# Patient Record
Sex: FEMALE | Race: WHITE | URBAN - METROPOLITAN AREA
[De-identification: names, ages, dates, MRNs, and addresses within clinical notes are randomized per-mention and may not be internally consistent; named-entity substitution may affect disease eponyms.]

---

## 2020-08-19 ENCOUNTER — HISTORICAL (OUTPATIENT)
Dept: ADMINISTRATIVE | Facility: HOSPITAL | Age: 65
End: 2020-08-19

## 2020-08-19 LAB
ABS NEUT (OLG): 2.64 X10(3)/MCL (ref 2.1–9.2)
ALBUMIN SERPL-MCNC: 3.9 GM/DL (ref 3.4–5)
ALBUMIN/GLOB SERPL: 1.3 RATIO (ref 1.1–2)
ALP SERPL-CCNC: 94 UNIT/L (ref 40–150)
ALT SERPL-CCNC: 14 UNIT/L (ref 0–55)
APPEARANCE, UA: CLEAR
AST SERPL-CCNC: 18 UNIT/L (ref 5–34)
BACTERIA SPEC CULT: ABNORMAL /HPF
BASOPHILS # BLD AUTO: 0 X10(3)/MCL (ref 0–0.2)
BASOPHILS NFR BLD AUTO: 0 %
BILIRUB SERPL-MCNC: 0.6 MG/DL
BILIRUB UR QL STRIP: NEGATIVE
BILIRUBIN DIRECT+TOT PNL SERPL-MCNC: 0.3 MG/DL (ref 0–0.5)
BILIRUBIN DIRECT+TOT PNL SERPL-MCNC: 0.3 MG/DL (ref 0–0.8)
BUN SERPL-MCNC: 11 MG/DL (ref 9.8–20.1)
CALCIUM SERPL-MCNC: 8.6 MG/DL (ref 8.4–10.2)
CHLORIDE SERPL-SCNC: 109 MMOL/L (ref 98–107)
CHOLEST SERPL-MCNC: 294 MG/DL
CHOLEST/HDLC SERPL: 4 {RATIO} (ref 0–5)
CO2 SERPL-SCNC: 27 MMOL/L (ref 23–31)
COLOR UR: YELLOW
CREAT SERPL-MCNC: 0.77 MG/DL (ref 0.55–1.02)
ERYTHROCYTE [DISTWIDTH] IN BLOOD BY AUTOMATED COUNT: 13.7 % (ref 11.5–17)
GLOBULIN SER-MCNC: 2.9 GM/DL (ref 2.4–3.5)
GLUCOSE (UA): NEGATIVE
GLUCOSE SERPL-MCNC: 83 MG/DL (ref 82–115)
HCT VFR BLD AUTO: 40.9 % (ref 37–47)
HDLC SERPL-MCNC: 67 MG/DL (ref 35–60)
HGB BLD-MCNC: 13.1 GM/DL (ref 12–16)
HGB UR QL STRIP: NEGATIVE
KETONES UR QL STRIP: NEGATIVE
LDLC SERPL CALC-MCNC: 211 MG/DL (ref 50–140)
LEUKOCYTE ESTERASE UR QL STRIP: ABNORMAL
LYMPHOCYTES # BLD AUTO: 2.3 X10(3)/MCL (ref 0.6–4.6)
LYMPHOCYTES NFR BLD AUTO: 44 %
MCH RBC QN AUTO: 30 PG (ref 27–31)
MCHC RBC AUTO-ENTMCNC: 32 GM/DL (ref 33–36)
MCV RBC AUTO: 93.6 FL (ref 80–94)
MONOCYTES # BLD AUTO: 0.2 X10(3)/MCL (ref 0.1–1.3)
MONOCYTES NFR BLD AUTO: 5 %
NEUTROPHILS # BLD AUTO: 2.64 X10(3)/MCL (ref 2.1–9.2)
NEUTROPHILS NFR BLD AUTO: 50 %
NITRITE UR QL STRIP: NEGATIVE
PH UR STRIP: 6 [PH] (ref 5–9)
PLATELET # BLD AUTO: 282 X10(3)/MCL (ref 130–400)
PMV BLD AUTO: 9.6 FL (ref 9.4–12.4)
POTASSIUM SERPL-SCNC: 4 MMOL/L (ref 3.5–5.1)
PROT SERPL-MCNC: 6.8 GM/DL (ref 5.8–7.6)
PROT UR QL STRIP: NEGATIVE
RBC # BLD AUTO: 4.37 X10(6)/MCL (ref 4.2–5.4)
RBC #/AREA URNS HPF: ABNORMAL /[HPF]
SODIUM SERPL-SCNC: 143 MMOL/L (ref 136–145)
SP GR UR STRIP: 1.01 (ref 1–1.03)
SQUAMOUS EPITHELIAL, UA: ABNORMAL
TRIGL SERPL-MCNC: 78 MG/DL (ref 37–140)
UROBILINOGEN UR STRIP-ACNC: 0.2
VLDLC SERPL CALC-MCNC: 16 MG/DL
WBC # SPEC AUTO: 5.2 X10(3)/MCL (ref 4.5–11.5)
WBC #/AREA URNS HPF: ABNORMAL /[HPF]

## 2021-07-27 ENCOUNTER — HISTORICAL (OUTPATIENT)
Dept: ADMINISTRATIVE | Facility: HOSPITAL | Age: 66
End: 2021-07-27

## 2021-08-02 ENCOUNTER — HISTORICAL (OUTPATIENT)
Dept: ADMINISTRATIVE | Facility: HOSPITAL | Age: 66
End: 2021-08-02

## 2021-08-02 LAB
ALBUMIN SERPL-MCNC: 3.5 GM/DL (ref 3.4–4.8)
ALBUMIN/GLOB SERPL: 1.1 RATIO (ref 1.1–2)
ALP SERPL-CCNC: 81 UNIT/L (ref 40–150)
ALT SERPL-CCNC: 30 UNIT/L (ref 0–55)
AST SERPL-CCNC: 30 UNIT/L (ref 5–34)
BILIRUB SERPL-MCNC: 0.3 MG/DL
BILIRUBIN DIRECT+TOT PNL SERPL-MCNC: 0.1 MG/DL (ref 0–0.5)
BILIRUBIN DIRECT+TOT PNL SERPL-MCNC: 0.2 MG/DL (ref 0–0.8)
BUN SERPL-MCNC: 14.7 MG/DL (ref 9.8–20.1)
CALCIUM SERPL-MCNC: 9 MG/DL (ref 8.4–10.2)
CHLORIDE SERPL-SCNC: 105 MMOL/L (ref 98–107)
CHOLEST SERPL-MCNC: 262 MG/DL
CHOLEST/HDLC SERPL: 3 {RATIO} (ref 0–5)
CO2 SERPL-SCNC: 25 MMOL/L (ref 23–31)
CREAT SERPL-MCNC: 0.72 MG/DL (ref 0.55–1.02)
GLOBULIN SER-MCNC: 3.3 GM/DL (ref 2.4–3.5)
GLUCOSE SERPL-MCNC: 110 MG/DL (ref 82–115)
HDLC SERPL-MCNC: 85 MG/DL (ref 35–60)
LDLC SERPL CALC-MCNC: 158 MG/DL (ref 50–140)
POTASSIUM SERPL-SCNC: 4.3 MMOL/L (ref 3.5–5.1)
PROT SERPL-MCNC: 6.8 GM/DL (ref 5.8–7.6)
SODIUM SERPL-SCNC: 141 MMOL/L (ref 136–145)
TRIGL SERPL-MCNC: 97 MG/DL (ref 37–140)
VLDLC SERPL CALC-MCNC: 19 MG/DL

## 2021-08-10 ENCOUNTER — HISTORICAL (OUTPATIENT)
Dept: ADMINISTRATIVE | Facility: HOSPITAL | Age: 66
End: 2021-08-10

## 2021-09-07 ENCOUNTER — HISTORICAL (OUTPATIENT)
Dept: ADMINISTRATIVE | Facility: HOSPITAL | Age: 66
End: 2021-09-07

## 2022-04-07 ENCOUNTER — HISTORICAL (OUTPATIENT)
Dept: ADMINISTRATIVE | Facility: HOSPITAL | Age: 67
End: 2022-04-07

## 2022-04-24 VITALS
HEIGHT: 65 IN | SYSTOLIC BLOOD PRESSURE: 162 MMHG | BODY MASS INDEX: 23.99 KG/M2 | WEIGHT: 144 LBS | DIASTOLIC BLOOD PRESSURE: 92 MMHG

## 2024-11-03 ENCOUNTER — HOSPITAL ENCOUNTER (EMERGENCY)
Facility: HOSPITAL | Age: 69
Discharge: HOME | End: 2024-11-03
Attending: INTERNAL MEDICINE
Payer: MEDICARE

## 2024-11-03 VITALS
HEART RATE: 86 BPM | WEIGHT: 160 LBS | BODY MASS INDEX: 25.71 KG/M2 | RESPIRATION RATE: 18 BRPM | DIASTOLIC BLOOD PRESSURE: 99 MMHG | HEIGHT: 66 IN | TEMPERATURE: 98 F | SYSTOLIC BLOOD PRESSURE: 146 MMHG | OXYGEN SATURATION: 96 %

## 2024-11-03 DIAGNOSIS — M25.529 ELBOW PAIN: ICD-10-CM

## 2024-11-03 DIAGNOSIS — Z87.828 H/O REDUCTION OF CLOSED DISLOCATION: ICD-10-CM

## 2024-11-03 DIAGNOSIS — S53.125A CLOSED POSTERIOR DISLOCATION OF LEFT ELBOW, INITIAL ENCOUNTER: Primary | ICD-10-CM

## 2024-11-03 LAB
ALBUMIN SERPL-MCNC: 3.6 G/DL (ref 3.4–4.8)
ALBUMIN/GLOB SERPL: 1.1 RATIO (ref 1.1–2)
ALP SERPL-CCNC: 100 UNIT/L (ref 40–150)
ALT SERPL-CCNC: 30 UNIT/L (ref 0–55)
ANION GAP SERPL CALC-SCNC: 13 MEQ/L
AST SERPL-CCNC: 22 UNIT/L (ref 5–34)
BASOPHILS # BLD AUTO: 0.03 X10(3)/MCL
BASOPHILS NFR BLD AUTO: 0.3 %
BILIRUB SERPL-MCNC: 0.3 MG/DL
BUN SERPL-MCNC: 10.5 MG/DL (ref 9.8–20.1)
CALCIUM SERPL-MCNC: 9.2 MG/DL (ref 8.4–10.2)
CHLORIDE SERPL-SCNC: 111 MMOL/L (ref 98–107)
CO2 SERPL-SCNC: 19 MMOL/L (ref 23–31)
CREAT SERPL-MCNC: 0.87 MG/DL (ref 0.55–1.02)
CREAT/UREA NIT SERPL: 12
EOSINOPHIL # BLD AUTO: 0.01 X10(3)/MCL (ref 0–0.9)
EOSINOPHIL NFR BLD AUTO: 0.1 %
ERYTHROCYTE [DISTWIDTH] IN BLOOD BY AUTOMATED COUNT: 13.2 % (ref 11.5–17)
GFR SERPLBLD CREATININE-BSD FMLA CKD-EPI: >60 ML/MIN/1.73/M2
GLOBULIN SER-MCNC: 3.3 GM/DL (ref 2.4–3.5)
GLUCOSE SERPL-MCNC: 127 MG/DL (ref 82–115)
HCT VFR BLD AUTO: 40.5 % (ref 37–47)
HGB BLD-MCNC: 13.7 G/DL (ref 12–16)
IMM GRANULOCYTES # BLD AUTO: 0.04 X10(3)/MCL (ref 0–0.04)
IMM GRANULOCYTES NFR BLD AUTO: 0.4 %
LYMPHOCYTES # BLD AUTO: 0.94 X10(3)/MCL (ref 0.6–4.6)
LYMPHOCYTES NFR BLD AUTO: 9.1 %
MCH RBC QN AUTO: 31.3 PG (ref 27–31)
MCHC RBC AUTO-ENTMCNC: 33.8 G/DL (ref 33–36)
MCV RBC AUTO: 92.5 FL (ref 80–94)
MONOCYTES # BLD AUTO: 0.4 X10(3)/MCL (ref 0.1–1.3)
MONOCYTES NFR BLD AUTO: 3.9 %
NEUTROPHILS # BLD AUTO: 8.95 X10(3)/MCL (ref 2.1–9.2)
NEUTROPHILS NFR BLD AUTO: 86.2 %
NRBC BLD AUTO-RTO: 0 %
PLATELET # BLD AUTO: 300 X10(3)/MCL (ref 130–400)
PMV BLD AUTO: 9.2 FL (ref 7.4–10.4)
POTASSIUM SERPL-SCNC: 3.5 MMOL/L (ref 3.5–5.1)
PROT SERPL-MCNC: 6.9 GM/DL (ref 5.8–7.6)
RBC # BLD AUTO: 4.38 X10(6)/MCL (ref 4.2–5.4)
SODIUM SERPL-SCNC: 143 MMOL/L (ref 136–145)
WBC # BLD AUTO: 10.37 X10(3)/MCL (ref 4.5–11.5)

## 2024-11-03 PROCEDURE — 25000003 PHARM REV CODE 250: Performed by: INTERNAL MEDICINE

## 2024-11-03 PROCEDURE — 80053 COMPREHEN METABOLIC PANEL: CPT | Performed by: INTERNAL MEDICINE

## 2024-11-03 PROCEDURE — 99285 EMERGENCY DEPT VISIT HI MDM: CPT | Mod: 25

## 2024-11-03 PROCEDURE — 99152 MOD SED SAME PHYS/QHP 5/>YRS: CPT

## 2024-11-03 PROCEDURE — 96361 HYDRATE IV INFUSION ADD-ON: CPT

## 2024-11-03 PROCEDURE — 85025 COMPLETE CBC W/AUTO DIFF WBC: CPT | Performed by: INTERNAL MEDICINE

## 2024-11-03 PROCEDURE — 96374 THER/PROPH/DIAG INJ IV PUSH: CPT

## 2024-11-03 PROCEDURE — 63600175 PHARM REV CODE 636 W HCPCS: Performed by: INTERNAL MEDICINE

## 2024-11-03 PROCEDURE — 24600 TX CLSD ELBOW DISLC W/O ANES: CPT | Mod: LT

## 2024-11-03 PROCEDURE — 96375 TX/PRO/DX INJ NEW DRUG ADDON: CPT

## 2024-11-03 PROCEDURE — 99900035 HC TECH TIME PER 15 MIN (STAT)

## 2024-11-03 RX ORDER — ONDANSETRON HYDROCHLORIDE 2 MG/ML
4 INJECTION, SOLUTION INTRAVENOUS ONCE
Status: COMPLETED | OUTPATIENT
Start: 2024-11-03 | End: 2024-11-03

## 2024-11-03 RX ORDER — HYDROCODONE BITARTRATE AND ACETAMINOPHEN 7.5; 325 MG/1; MG/1
1 TABLET ORAL EVERY 6 HOURS PRN
Qty: 12 TABLET | Refills: 0 | Status: SHIPPED | OUTPATIENT
Start: 2024-11-03

## 2024-11-03 RX ORDER — ETOMIDATE 2 MG/ML
INJECTION INTRAVENOUS
Status: DISCONTINUED
Start: 2024-11-03 | End: 2024-11-03 | Stop reason: HOSPADM

## 2024-11-03 RX ORDER — ETOMIDATE 2 MG/ML
20 INJECTION INTRAVENOUS
Status: DISCONTINUED | OUTPATIENT
Start: 2024-11-03 | End: 2024-11-03 | Stop reason: HOSPADM

## 2024-11-03 RX ORDER — ONDANSETRON 4 MG/1
4 TABLET, ORALLY DISINTEGRATING ORAL EVERY 6 HOURS PRN
Qty: 15 TABLET | Refills: 0 | Status: SHIPPED | OUTPATIENT
Start: 2024-11-03

## 2024-11-03 RX ADMIN — ETOMIDATE 20 MG: 2 INJECTION INTRAVENOUS at 01:11

## 2024-11-03 RX ADMIN — SODIUM CHLORIDE 250 ML: 9 INJECTION, SOLUTION INTRAVENOUS at 01:11

## 2024-11-03 RX ADMIN — ONDANSETRON 4 MG: 2 INJECTION INTRAMUSCULAR; INTRAVENOUS at 12:11

## 2024-11-03 RX ADMIN — SODIUM CHLORIDE 250 ML: 9 INJECTION, SOLUTION INTRAVENOUS at 12:11

## 2024-11-03 NOTE — ED PROVIDER NOTES
Source of History:  Patient, no limitations    Chief complaint:  Fall (C/o left elbow pain and swelling s/p fall from bed (1 M) . Swelling noted to left elbow)      HPI:  Jennifer Corley is a 69 y.o. female presenting with Fall (C/o left elbow pain and swelling s/p fall from bed (1 M) . Swelling noted to left elbow)         The patient complains of pain in the left elbow  after a fall. Onset of symptoms was this morning. Patient describes pain as sharp/stabbing and throbbing. Pain severity now is intense. Pain is aggravated by movement. Pain is alleviated by immobilization. Symptoms associated with pain include loss of motion. The patient denies other injuries.  Care prior to arrival consisted of nothing          Review of Systems   Constitutional symptoms:  Negative except as documented in HPI.   Skin symptoms:  Negative except as documented in HPI.   HEENT symptoms:  Negative except as documented in HPI.   Respiratory symptoms:  Negative except as documented in HPI.   Cardiovascular symptoms:  Negative except as documented in HPI.   Gastrointestinal symptoms:  Negative except as documented in HPI.    Genitourinary symptoms:  Negative except as documented in HPI.   Musculoskeletal symptoms:  Negative except as documented in HPI.   Neurologic symptoms:  Negative except as documented in HPI.   Psychiatric symptoms:  Negative except as documented in HPI.   Allergy/immunologic symptoms:  Negative except as documented in HPI.             Additional review of systems information: All other systems reviewed and otherwise negative.      Review of patient's allergies indicates:  No Known Allergies    PMH:  As per HPI and below:    History reviewed. No pertinent past medical history.    History reviewed. No pertinent family history.    History reviewed. No pertinent surgical history.    Social History     Tobacco Use    Smoking status: Unknown       There is no problem list on file for this patient.       Physical Exam:   "  BP (!) 167/110 (BP Location: Right arm, Patient Position: Lying)   Pulse 99   Temp 98.2 °F (36.8 °C) (Oral)   Resp 18   Ht 5' 6" (1.676 m)   Wt 72.6 kg (160 lb)   SpO2 (!) 94%   Breastfeeding No   BMI 25.82 kg/m²     Nursing note and vital signs reviewed.    General:  Alert, appears uncomfortable and in pain  Skin: Normal for Ethnic Origin, No cyanosis  HEENT: Normocephalic and atraumatic, Vision unchanged, Pupils symmetric, No icterus , Nasal mucosa is pink and moist  Cardiovascular:  Regular rate and rhythm, No edema  Chest Wall: No deformity, equal chest rise  Respiratory:  Lungs are clear to auscultation, respirations are non-labored.    Musculoskeletal:  left elbow deformity, N/V intact distal to injury, Normal perfusion to all extremities  Gastrointestinal:  Soft, Non distended  Neurological:  Alert and oriented, normal motor observed, normal speech observed.    Psychiatric:  Cooperative, appropriate mood & affect.        Labs that have been ordered have been independently reviewed and interpreted by myself.     Old Chart Reviewed.      Initial Impression/ Differential Dx:  Bursitis, muscle strain, tendonitis, arthritis, cervical radiculopathy, dislocation, fracture, neuropathy      MDM:      Reviewed Nurses Note.    Reviewed Pertinent old records.    Orders Placed This Encounter    PROCEDURAL SEDATION ED    ORTHOPEDIC INJURY TREATMENT    Pulse Oximeter    SLING SWATHE UNIV FOAM 5X54IN    X-Ray Elbow Complete Left    X-Ray Elbow 2 Views Left    CBC Auto Differential    Comprehensive Metabolic Panel    CBC with Differential    Ambulatory referral/consult to Orthopedics    Prepare for procedural sedation    Cardiac Monitoring - Adult    Apply Sling    Insert peripheral IV    ondansetron injection 4 mg    sodium chloride 0.9% bolus 250 mL 250 mL    etomidate injection 20 mg    sodium chloride 0.9% bolus 250 mL 250 mL    etomidate (AMIDATE) 2 mg/mL injection    HYDROcodone-acetaminophen (NORCO) 7.5-325 " mg per tablet    ondansetron (ZOFRAN-ODT) 4 MG TbDL                    Labs Reviewed   COMPREHENSIVE METABOLIC PANEL - Abnormal       Result Value    Sodium 143      Potassium 3.5      Chloride 111 (*)     CO2 19 (*)     Glucose 127 (*)     Blood Urea Nitrogen 10.5      Creatinine 0.87      Calcium 9.2      Protein Total 6.9      Albumin 3.6      Globulin 3.3      Albumin/Globulin Ratio 1.1      Bilirubin Total 0.3            ALT 30      AST 22      eGFR >60      Anion Gap 13.0      BUN/Creatinine Ratio 12     CBC WITH DIFFERENTIAL - Abnormal    WBC 10.37      RBC 4.38      Hgb 13.7      Hct 40.5      MCV 92.5      MCH 31.3 (*)     MCHC 33.8      RDW 13.2      Platelet 300      MPV 9.2      Neut % 86.2      Lymph % 9.1      Mono % 3.9      Eos % 0.1      Basophil % 0.3      Lymph # 0.94      Neut # 8.95      Mono # 0.40      Eos # 0.01      Baso # 0.03      IG# 0.04      IG% 0.4      NRBC% 0.0     CBC W/ AUTO DIFFERENTIAL    Narrative:     The following orders were created for panel order CBC Auto Differential.  Procedure                               Abnormality         Status                     ---------                               -----------         ------                     CBC with Differential[[id number]]        Abnormal            Final result                 Please view results for these tests on the individual orders.          X-Ray Elbow 2 Views Left   Final Result      X-Ray Elbow Complete Left   Final Result      As above.         Electronically signed by: Franco Vaughn   Date:    11/03/2024   Time:    12:08           Admission on 11/03/2024   Component Date Value Ref Range Status    Sodium 11/03/2024 143  136 - 145 mmol/L Final    Potassium 11/03/2024 3.5  3.5 - 5.1 mmol/L Final    Chloride 11/03/2024 111 (H)  98 - 107 mmol/L Final    CO2 11/03/2024 19 (L)  23 - 31 mmol/L Final    Glucose 11/03/2024 127 (H)  82 - 115 mg/dL Final    Blood Urea Nitrogen 11/03/2024 10.5  9.8 - 20.1 mg/dL Final     Creatinine 11/03/2024 0.87  0.55 - 1.02 mg/dL Final    Calcium 11/03/2024 9.2  8.4 - 10.2 mg/dL Final    Protein Total 11/03/2024 6.9  5.8 - 7.6 gm/dL Final    Albumin 11/03/2024 3.6  3.4 - 4.8 g/dL Final    Globulin 11/03/2024 3.3  2.4 - 3.5 gm/dL Final    Albumin/Globulin Ratio 11/03/2024 1.1  1.1 - 2.0 ratio Final    Bilirubin Total 11/03/2024 0.3  <=1.5 mg/dL Final    ALP 11/03/2024 100  40 - 150 unit/L Final    ALT 11/03/2024 30  0 - 55 unit/L Final    AST 11/03/2024 22  5 - 34 unit/L Final    eGFR 11/03/2024 >60  mL/min/1.73/m2 Final    Anion Gap 11/03/2024 13.0  mEq/L Final    BUN/Creatinine Ratio 11/03/2024 12   Final    WBC 11/03/2024 10.37  4.50 - 11.50 x10(3)/mcL Final    RBC 11/03/2024 4.38  4.20 - 5.40 x10(6)/mcL Final    Hgb 11/03/2024 13.7  12.0 - 16.0 g/dL Final    Hct 11/03/2024 40.5  37.0 - 47.0 % Final    MCV 11/03/2024 92.5  80.0 - 94.0 fL Final    MCH 11/03/2024 31.3 (H)  27.0 - 31.0 pg Final    MCHC 11/03/2024 33.8  33.0 - 36.0 g/dL Final    RDW 11/03/2024 13.2  11.5 - 17.0 % Final    Platelet 11/03/2024 300  130 - 400 x10(3)/mcL Final    MPV 11/03/2024 9.2  7.4 - 10.4 fL Final    Neut % 11/03/2024 86.2  % Final    Lymph % 11/03/2024 9.1  % Final    Mono % 11/03/2024 3.9  % Final    Eos % 11/03/2024 0.1  % Final    Basophil % 11/03/2024 0.3  % Final    Lymph # 11/03/2024 0.94  0.6 - 4.6 x10(3)/mcL Final    Neut # 11/03/2024 8.95  2.1 - 9.2 x10(3)/mcL Final    Mono # 11/03/2024 0.40  0.1 - 1.3 x10(3)/mcL Final    Eos # 11/03/2024 0.01  0 - 0.9 x10(3)/mcL Final    Baso # 11/03/2024 0.03  <=0.2 x10(3)/mcL Final    IG# 11/03/2024 0.04  0 - 0.04 x10(3)/mcL Final    IG% 11/03/2024 0.4  % Final    NRBC% 11/03/2024 0.0  % Final       Imaging Results              X-Ray Elbow 2 Views Left (Final result)  Result time 11/03/24 13:56:09   Procedure changed from X-Ray Elbow Complete Left     Final result by Hair Lo MD (11/03/24 13:56:09)                   Narrative:    EXAMINATION  XR ELBOW 2  VIEWS LEFT    CLINICAL HISTORY  post reduction; Personal history of other (healed) physical injury and trauma    TECHNIQUE  A total of 1 image submitted of the left elbow.    COMPARISON  3 November 2024, 11:47    FINDINGS  Previous posterior dislocation reduced in the interval, unremarkable joint alignment.  No definite displaced fracture is identified by limited single view technique.  There is irregular lucency along the distal anterior and posterior humerus suggestive of joint effusion with displaced fat pads.    No new soft tissue abnormality.    IMPRESSION  1. Posterior left elbow dislocation reduced in the interval.  Alignment unremarkable within limitations of single-view technique.  2. Presence of joint effusion with no definite displaced fracture.  Close clinical and imaging follow-up recommended if there is elevated concern for occult nondisplaced fracture.      Electronically signed by: Hair Lo  Date:    11/03/2024  Time:    13:56                                     X-Ray Elbow Complete Left (Final result)  Result time 11/03/24 12:08:24      Final result by Franco Vaughn MD (11/03/24 12:08:24)                   Impression:      As above.      Electronically signed by: Franco Vaughn  Date:    11/03/2024  Time:    12:08               Narrative:    EXAMINATION:  XR ELBOW COMPLETE 3 VIEW LEFT    CLINICAL HISTORY:  Pain in unspecified elbow    TECHNIQUE:  Three views of the left elbow.    COMPARISON:  No prior imaging available for comparison    FINDINGS:  Posterior dislocation is noted.  No definite fracture is identified.  There is a small os ossific density adjacent to the olecranon which may represent avulsed fracture fragment.  Recommend continued follow-up.                                                     ED Course as of 11/03/24 1407   Sun Nov 03, 2024   1154 Last oral intake was a cinnamon roll around 10:00 a.m., will delay procedural sedation till around 1 p.m. or after [MP]   1326  CO2(!): 19 [MP]      ED Course User Index  [MP] Chris Lang DO                Procedural Sedation        Date/Time: 11/3/2024 1:43 PM    Performed by: Chris Lang DO  Authorized by: Chris Lang DO  ASA Class: Class 2 - Mild Illness without functional impairment.  Mallampati Score: Class 2 - Visualization of the soft palate, fauces, and uvula.   NPO STATUS:  Date/Time of last solid: 11/3/2024 10:00 AM  Contents of last solid: cinnamon roll    Equipment: on cardiac monitor., on supplemental oxygen., on BP monitor., suction available., on CO2 monitor. and airway equipment available.     Sedation type: moderate (conscious) sedation    Sedatives: etomidate  Sedation start date/time: 11/3/2024 1:36 PM  Sedation end date/time: 11/3/2024 1:42 PM  Vitals: Vital signs were monitored during sedation.  Complications: No complications.   Patient/Family history of anesthesia or sedation complications: No    Orthopedic Injury    Date/Time: 11/3/2024 1:46 PM    Performed by: Chris Lang DO  Authorized by: Chris Lang DO    Location procedure was performed:  [facility] EMERGENCY DEPARTMENT  Consent Done?:  Yes  Universal Protocol:     Verbal consent obtained?: Yes      Written consent obtained?: Yes      Risks and benefits: Risks, benefits and alternatives were discussed      Consent given by:  Patient    Patient states understanding of procedure being performed: Yes      Patient's understanding of procedure matches consent: Yes      Procedure consent matches procedure scheduled: Yes      Relevant documents present and verified: Yes      Test results available and properly labeled: Yes      Site marked: Yes      Patient identity confirmed:  Verbally with patient and name    Time Out: Immediately prior to the procedure a time out was called    Injury:     Injury location:  Elbow    Location details:  Left elbow    Injury type:  Dislocation    Dislocation type: posterior        Pre-procedure  assessment:     Neurovascular status: Neurovascularly intact      Distal perfusion: normal      Neurological function: normal      Range of motion: reduced      Local anesthesia used?: No      Patient sedated?: Yes      ASA Class:  Class 2 - Mild Illness without functional impairment.    Mallampati Score:  Class 2 - Visualization of the soft palate, fauces, and uvula.  Date/Time of last solid:  11/3/2024 10:00 AM    Contents of Last Food Intake:  Cinnamon roll    Patient/Family history of anesthesia or sedation complications: No      Sedation type: moderate (conscious) sedation      Sedation:  Etomidate    Sedation start:  11/3/2024 1:36 PM    Sedation end:  11/3/2024 1:42 PM      Procedure details:     Description of findings:  Posterior elbow dislocation, left side  Selections made in this section will also lock the Injury type section above.:     Manipulation performed?: Yes      Reduction method:  Flexion and direct traction    Reduction method:  Flexion and direct traction    Reduction method:  Flexion and direct traction    Reduction method:  Flexion and direct traction    Reduction method:  Flexion and direct traction    Reduction method:  Flexion and direct traction    Reduction successful?: Yes      Confirmation: Reduction confirmed by x-ray      Immobilization:  Sling    Technical Procedures Used:  Reduction of posterior dislocation of elbow    Significant surgical tasks conducted by the assistant(s):  Closed reduction of posterior left elbow dislocation    Complications: No      Estimated blood loss (mL):  0    Specimens: No      Implants: No    Post-procedure assessment:     Neurovascular status: Neurovascularly intact      Distal perfusion: normal      Neurological function: normal      Range of motion: normal      Patient tolerance:  Patient tolerated the procedure well with no immediate complications            Diagnostic Impression:    1. Closed posterior dislocation of left elbow, initial encounter     2. Elbow pain    3. H/O reduction of closed dislocation         ED Disposition Condition    Discharge Stable             Follow-up Information       [facility] - Emergency Dept.    Specialty: Emergency Medicine  Why: If symptoms worsen  Contact information:  [address]  [facility] [id number] [phone]             Call  Pola Riley DO.    Specialty: Orthopedic Surgery  Contact information:  [facility]  [address] [place] [address]  [phone]                              ED Prescriptions       Medication Sig Dispense Start Date End Date Auth. Provider    HYDROcodone-acetaminophen (NORCO) 7.5-325 mg per tablet Take 1 tablet by mouth every 6 (six) hours as needed for Pain. 12 tablet 11/3/2024 -- Chris Lang DO    ondansetron (ZOFRAN-ODT) 4 MG TbDL Take 1 tablet (4 mg total) by mouth every 6 (six) hours as needed (Nausea). 15 tablet 11/3/2024 -- Chris Lang DO          Follow-up Information       Follow up With Specialties Details Why Contact Info    [facility] - Emergency Dept Emergency Medicine  If symptoms worsen [address]  [facility] [id number] [phone]    Pola Riley DO Orthopedic Surgery Call   [address] [facility]  [address] [place] [id number]  [phone]               Chris Lang DO  11/03/24 1401

## 2024-11-07 DIAGNOSIS — S53.125A CLOSED POSTERIOR DISLOCATION OF LEFT ELBOW, INITIAL ENCOUNTER: Primary | ICD-10-CM

## 2024-11-13 ENCOUNTER — OFFICE VISIT (OUTPATIENT)
Dept: ORTHOPEDICS | Facility: CLINIC | Age: 69
End: 2024-11-13
Payer: MEDICARE

## 2024-11-13 ENCOUNTER — HOSPITAL ENCOUNTER (OUTPATIENT)
Dept: RADIOLOGY | Facility: CLINIC | Age: 69
Discharge: HOME | End: 2024-11-13
Attending: ORTHOPAEDIC SURGERY
Payer: MEDICARE

## 2024-11-13 VITALS
DIASTOLIC BLOOD PRESSURE: 89 MMHG | WEIGHT: 158.75 LBS | TEMPERATURE: 83 F | HEIGHT: 66 IN | BODY MASS INDEX: 25.51 KG/M2 | SYSTOLIC BLOOD PRESSURE: 130 MMHG

## 2024-11-13 DIAGNOSIS — S53.115A ANTERIOR DISLOCATION OF LEFT ELBOW, INITIAL ENCOUNTER: Primary | ICD-10-CM

## 2024-11-13 DIAGNOSIS — S53.115A ANTERIOR DISLOCATION OF LEFT ELBOW, INITIAL ENCOUNTER: ICD-10-CM

## 2024-11-13 DIAGNOSIS — S53.125A CLOSED POSTERIOR DISLOCATION OF LEFT ELBOW, INITIAL ENCOUNTER: ICD-10-CM

## 2024-11-13 PROCEDURE — 3008F BODY MASS INDEX DOCD: CPT | Mod: CPTII,,, | Performed by: ORTHOPAEDIC SURGERY

## 2024-11-13 PROCEDURE — 1159F MED LIST DOCD IN RCRD: CPT | Mod: CPTII,,, | Performed by: ORTHOPAEDIC SURGERY

## 2024-11-13 PROCEDURE — 99204 OFFICE O/P NEW MOD 45 MIN: CPT | Mod: ,,, | Performed by: ORTHOPAEDIC SURGERY

## 2024-11-13 PROCEDURE — 3075F SYST BP GE 130 - 139MM HG: CPT | Mod: CPTII,,, | Performed by: ORTHOPAEDIC SURGERY

## 2024-11-13 PROCEDURE — 3079F DIAST BP 80-89 MM HG: CPT | Mod: CPTII,,, | Performed by: ORTHOPAEDIC SURGERY

## 2024-11-13 PROCEDURE — 73080 X-RAY EXAM OF ELBOW: CPT | Mod: LT,,, | Performed by: ORTHOPAEDIC SURGERY

## 2024-11-13 RX ORDER — HYDROXYZINE HYDROCHLORIDE 25 MG/1
TABLET, FILM COATED ORAL
COMMUNITY
Start: 2024-07-29

## 2024-11-13 RX ORDER — SERTRALINE HYDROCHLORIDE 100 MG/1
100 TABLET, FILM COATED ORAL EVERY MORNING
COMMUNITY
Start: 2024-08-19

## 2024-11-13 RX ORDER — OLANZAPINE 15 MG/1
15 TABLET ORAL NIGHTLY
COMMUNITY
Start: 2024-08-21

## 2024-11-13 RX ORDER — HYDROCODONE BITARTRATE AND ACETAMINOPHEN 7.5; 325 MG/1; MG/1
1 TABLET ORAL EVERY 4 HOURS PRN
Qty: 34 TABLET | Refills: 0 | Status: SHIPPED | OUTPATIENT
Start: 2024-11-13

## 2024-11-13 NOTE — PROGRESS NOTES
Subjective:       Patient ID: Jennifer Corley is a 69 y.o. female.  Chief Complaint   Patient presents with    Left Forearm - Injury     10 DAYS OUT FROM LEFT ELBOW DISLOCATION. NOT PROPERLY WEARING SLING AND SWATHE. DENIES INCREASE PAIN AND DISCOMFORT.        HPI:  History of Present Illness    HPI:  Ms. Corley presents for follow-up of an elbow dislocation that occurred approximately 10-11 days ago. She reports ongoing pain in the elbow and states she has run out of prescribed pain medication. She has been managing the pain with OTC medication, taking ibuprofen 800 mg every six hours for almost a week since running out of prescribed pain medication, and also mentions taking low-dose aspirin. She reports difficulty elevating the arm and significant bruising. The pain is localized to the elbow, with no reported shoulder pain.    She has been using a sling and applying ice packs to manage the injury. When asked about movement, she describes it as smooth and states it feels acceptable. She has not been actively moving the arm, explaining she has been using the sling and applying ice packs. She denies having had surgery for this injury, though she was sedated for the initial reduction. She expresses concern about the range of motion, noting pain when attempting to extend the arm.    She denies any shoulder pain. She denies any history of surgery for this injury.    IMAGING:  X-rays of the patient's left elbow (3 views) taken on November 13, 2024, showed a stable elbow joint without signs of acute fracture. A possible effusion was noted, but there was no sign of residual dislocation. On November 3, 2024, a single-view X-ray of the left elbow confirmed successful reduction of the previous dislocation. Earlier that day, 3-view X-rays of the left elbow revealed a complete dislocation of the radial head, with the ulna appearing to be posterior.    MEDICATIONS:  Ms. Corley was prescribed Hydrocodone after the initial dislocation,  "but she ran out about a week ago. She is taking Ibuprofen 800 mg, 4 tablets every 6 hours for pain management, which she has been on for about a week. Ms. Corley is also on a low dose of Aspirin.      ROS:  Musculoskeletal: +joint pain, denies muscle pain          ROS:  Constitutional: Denies fever chills  Eyes: No change in vision  ENT: No ringing or current infections  CV: No chest pain  Resp: No labored breathing  MSK: Pain evident at site of injury located in HPI,   Integ: No signs of abrasions or lacerations  Neuro: No numbness or tingling  Lymphatic: No swelling outside the area of injury     Current Outpatient Medications on File Prior to Visit   Medication Sig Dispense Refill    HYDROcodone-acetaminophen (NORCO) 7.5-325 mg per tablet Take 1 tablet by mouth every 6 (six) hours as needed for Pain. 12 tablet 0    hydrOXYzine HCL (ATARAX) 25 MG tablet       OLANZapine (ZYPREXA) 15 MG Tab Take 15 mg by mouth every evening.      ondansetron (ZOFRAN-ODT) 4 MG TbDL Take 1 tablet (4 mg total) by mouth every 6 (six) hours as needed (Nausea). 15 tablet 0    sertraline (ZOLOFT) 100 MG tablet Take 100 mg by mouth every morning.       No current facility-administered medications on file prior to visit.          Objective:      /89   Temp (!) 83 °F (28.3 °C)   Ht 5' 6" (1.676 m)   Wt 72 kg (158 lb 11.7 oz)   BMI 25.62 kg/m²   General the patient is alert and oriented x3 no acute distress nontoxic-appearing appropriate affect.    Constitutional: Vital signs are examined and stable.  Resp: No signs of labored breathing    LUE: --Skin:  Significant amount of ecchymosis.  Elbow appears to be stable with a hinged exercises.  Appears smooth gliding motion supination pronation flexion-extension intact.  Patient has near full range of motion           -MSK: STR 5/5 AIN/PIN/Median/Radial/Ulnar motor           -Neuro:  Sensation intact to light touch C5-T1 dermatomes           -Lymphatic: No signs of lymphadenopathy, No " "signs of swelling,           -CV:Capillary refill is less than 2 seconds. Radial and ulnar pulses 2/4. Compartments Soft and compressible              Body mass index is 25.62 kg/m².  Ideal body weight: 59.3 kg (130 lb 11.7 oz)  Adjusted ideal body weight: 64.4 kg (141 lb 14.9 oz)  No results found for: "HGBA1C"  Hgb   Date Value Ref Range Status   11/03/2024 13.7 12.0 - 16.0 g/dL Final   08/19/2020 13.1 12.0 - 16.0 gm/dL Final     No results found for: "[id number]"  WBC   Date Value Ref Range Status   11/03/2024 10.37 4.50 - 11.50 x10(3)/mcL Final   08/19/2020 5.2 4.5 - 11.5 x10(3)/mcL Final       Radiology:  Three views left elbow skeletally mature individual shows elbow joint appears to be stable no sign of fracture.  Appears to be well reduced.        Assessment:         1. Anterior dislocation of left elbow, initial encounter  X-Ray Elbow Complete Left              Plan:         No follow-ups on file.    Jennifer was seen today for injury.    Diagnoses and all orders for this visit:    Anterior dislocation of left elbow, initial encounter  -     X-Ray Elbow Complete Left; Future      Assessment & Plan    PLAN SUMMARY:  - Prescribed Norco (hydrocodone) for 7 days with potential for lower dose refill  - Recommend ibuprofen 800mg every 8 hours as needed for pain  - Referred to physical therapy for elbow rehabilitation  - Discontinue use of sling  - Perform elbow exercises every 3 hours  - Follow up in about 4 weeks to ensure continued range of motion    FOLLOW UP:  - Follow up in about 4 weeks, after Thanksgiving but before Sky, to ensure continued range of motion.    RETURN TO ACTIVITY:  - Discontinue use of sling. Avoid extending arm straight out to prevent dislocation. Perform elbow exercises (bending and straightening) every 3 hours.    MEDICATIONS PRESCRIBED:  - Prescribed Norco (hydrocodone) for 7 days with potential for refill at lower dose. Recommend ibuprofen 800mg every 8 hours as needed for " pain.    REFERRALS:  - Referred to physical therapy for elbow rehabilitation and to maintain range of motion. Ms. Corley to schedule appointment across the [place].    LIFESTYLE:  - Perform elbow exercises every 3 hours, moving arm from shoulder to as straight as possible.          Patient understands physical therapy will help maintain range of motion.  We discussed dislocation events we discussed complications of the stiffness and instability.      This note/OR report was created with the assistance of  voice recognition software or phone  dictation.  There may be transcription errors as a result of using this technology however minimal. Effort has been made to assure accuracy of transcription but any obvious errors or omissions should be clarified with the author of the document.     This note was generated with the assistance of ambient listening technology. Verbal consent was obtained by the patient and accompanying visitor(s) for the recording of patient appointment to facilitate this note. I attest to having reviewed and edited the generated note for accuracy, though some syntax or spelling errors may persist. Please contact the author of this note for any clarification.       Pola Riley DO  Orthopedic Trauma Surgery  11/13/2024      No future appointments.

## 2024-12-09 ENCOUNTER — OFFICE VISIT (OUTPATIENT)
Dept: ORTHOPEDICS | Facility: CLINIC | Age: 69
End: 2024-12-09
Payer: MEDICARE

## 2024-12-09 ENCOUNTER — HOSPITAL ENCOUNTER (OUTPATIENT)
Dept: RADIOLOGY | Facility: CLINIC | Age: 69
Discharge: HOME | End: 2024-12-09
Attending: PHYSICIAN ASSISTANT
Payer: MEDICARE

## 2024-12-09 VITALS
HEIGHT: 66 IN | SYSTOLIC BLOOD PRESSURE: 146 MMHG | BODY MASS INDEX: 25.51 KG/M2 | DIASTOLIC BLOOD PRESSURE: 99 MMHG | WEIGHT: 158.75 LBS | HEART RATE: 82 BPM

## 2024-12-09 DIAGNOSIS — S53.125A CLOSED POSTERIOR DISLOCATION OF LEFT ELBOW, INITIAL ENCOUNTER: Primary | ICD-10-CM

## 2024-12-09 DIAGNOSIS — S53.125A CLOSED POSTERIOR DISLOCATION OF LEFT ELBOW, INITIAL ENCOUNTER: ICD-10-CM

## 2024-12-09 PROCEDURE — 73080 X-RAY EXAM OF ELBOW: CPT | Mod: LT,,, | Performed by: PHYSICIAN ASSISTANT

## 2024-12-09 PROCEDURE — 99213 OFFICE O/P EST LOW 20 MIN: CPT | Mod: ,,, | Performed by: PHYSICIAN ASSISTANT

## 2024-12-09 PROCEDURE — 3080F DIAST BP >= 90 MM HG: CPT | Mod: CPTII,,, | Performed by: PHYSICIAN ASSISTANT

## 2024-12-09 PROCEDURE — 3077F SYST BP >= 140 MM HG: CPT | Mod: CPTII,,, | Performed by: PHYSICIAN ASSISTANT

## 2024-12-09 PROCEDURE — 1159F MED LIST DOCD IN RCRD: CPT | Mod: CPTII,,, | Performed by: PHYSICIAN ASSISTANT

## 2024-12-09 PROCEDURE — 3008F BODY MASS INDEX DOCD: CPT | Mod: CPTII,,, | Performed by: PHYSICIAN ASSISTANT

## 2024-12-10 NOTE — PROGRESS NOTES
"Subjective:       Patient ID: Jennifer Corley is a 69 y.o. female.  Chief Complaint   Patient presents with    Left Elbow - Follow-up     5 week f/u from left elbow dislocation. Reports improvement in pain. Not currently in physical therapy.        HPI:  Patient presents for 5 weeks follow up left elbow dislocation. She is doing well overall. States she feels moderately stiff. Mild pain. She is not currently in physical therapy. Some soreness in the forearm intermittently. No numbness or tingling distally. Has limited her lifting the last few weeks.   Discussed therapy as well as strengthening exercises that she can do at home.           ROS:  Constitutional: Denies fever chills  Eyes: No change in vision  ENT: No ringing or current infections  CV: No chest pain  Resp: No labored breathing  MSK: Pain evident at site of injury located in HPI,   Integ: No signs of abrasions or lacerations  Neuro: No numbness or tingling  Lymphatic: No swelling outside the area of injury     Current Outpatient Medications on File Prior to Visit   Medication Sig Dispense Refill    HYDROcodone-acetaminophen (NORCO) 7.5-325 mg per tablet Take 1 tablet by mouth every 4 (four) hours as needed for Pain. 34 tablet 0    hydrOXYzine HCL (ATARAX) 25 MG tablet       OLANZapine (ZYPREXA) 15 MG Tab Take 15 mg by mouth every evening.      ondansetron (ZOFRAN-ODT) 4 MG TbDL Take 1 tablet (4 mg total) by mouth every 6 (six) hours as needed (Nausea). 15 tablet 0    sertraline (ZOLOFT) 100 MG tablet Take 100 mg by mouth every morning.       No current facility-administered medications on file prior to visit.          Objective:      BP (!) 146/99   Pulse 82   Ht 5' 6" (1.676 m)   Wt 72 kg (158 lb 11.7 oz)   BMI 25.62 kg/m²   General the patient is alert and oriented x3 no acute distress nontoxic-appearing appropriate affect.    Constitutional: Vital signs are examined and stable.  Resp: No signs of labored breathing    LUE: --Skin: no significant " "stiffness noted today, if any mild lack of extension.  Appears smooth gliding motion supination pronation flexion-extension intact at this time.           -MSK: STR 5/5 AIN/PIN/Median/Radial/Ulnar motor           -Neuro:  Sensation intact to light touch C5-T1 dermatomes           -Lymphatic: No signs of lymphadenopathy, No signs of swelling,           -CV:Capillary refill is less than 2 seconds. Radial and ulnar pulses 2/4. Compartments Soft and compressible              Body mass index is 25.62 kg/m².  Ideal body weight: 59.3 kg (130 lb 11.7 oz)  Adjusted ideal body weight: 64.4 kg (141 lb 14.9 oz)  No results found for: "HGBA1C"  Hgb   Date Value Ref Range Status   11/03/2024 13.7 12.0 - 16.0 g/dL Final   08/19/2020 13.1 12.0 - 16.0 gm/dL Final     No results found for: "UMTGGSNN96DT"  WBC   Date Value Ref Range Status   11/03/2024 10.37 4.50 - 11.50 x10(3)/mcL Final   08/19/2020 5.2 4.5 - 11.5 x10(3)/mcL Final       Radiology:  Three views left elbow skeletally mature individual shows elbow joint appears to be stable no sign of fracture. Remains well reduced with the capitellum located with the trochlear notch.        Assessment:         1. Closed posterior dislocation of left elbow, initial encounter  X-Ray Elbow Complete Left    Ambulatory referral/consult to Physical/Occupational Therapy              Plan:         Follow up in about 6 weeks (around 1/20/2025).    Jennifer was seen today for follow-up.    Diagnoses and all orders for this visit:    Closed posterior dislocation of left elbow, initial encounter  -     X-Ray Elbow Complete Left; Future  -     Ambulatory referral/consult to Physical/Occupational Therapy; Future      Physical therapy for ROM and strengthening. Also discussed home exercises with her today for supination, pronation, flexion and extension. She may WBAT and ROMAT. Discussed risks of dislocation event again. Possible follow up with sports if this becomes a chronic issue. Stable on x ray at " this time. Will see her back in about 6-8 weeks for repeat x rays and evaluation.     The above findings, diagnostics, and treatment plan were discussed with Dr. Riley who is in agreement with the plan of care except as stated in additional documentation.     Nela Turner PA-C  [facility]   Orthopedic Trauma        Future Appointments   Date Time Provider Department Center   2/11/2025 10:00 AM Pola Riley DO [facility]   7/22/2025  1:00 PM Jesse Vilchis MD [id number]

## 2025-02-11 ENCOUNTER — HOSPITAL ENCOUNTER (OUTPATIENT)
Dept: RADIOLOGY | Facility: CLINIC | Age: 70
Discharge: HOME | End: 2025-02-11
Attending: ORTHOPAEDIC SURGERY
Payer: MEDICARE

## 2025-02-11 ENCOUNTER — OFFICE VISIT (OUTPATIENT)
Dept: ORTHOPEDICS | Facility: CLINIC | Age: 70
End: 2025-02-11
Payer: MEDICARE

## 2025-02-11 VITALS
HEIGHT: 66 IN | BODY MASS INDEX: 25.51 KG/M2 | SYSTOLIC BLOOD PRESSURE: 117 MMHG | WEIGHT: 158.75 LBS | DIASTOLIC BLOOD PRESSURE: 80 MMHG

## 2025-02-11 DIAGNOSIS — S53.125D CLOSED POSTERIOR DISLOCATION OF LEFT ELBOW, SUBSEQUENT ENCOUNTER: Primary | ICD-10-CM

## 2025-02-11 DIAGNOSIS — S53.125D CLOSED POSTERIOR DISLOCATION OF LEFT ELBOW, SUBSEQUENT ENCOUNTER: ICD-10-CM

## 2025-02-11 PROCEDURE — 1159F MED LIST DOCD IN RCRD: CPT | Mod: CPTII,,, | Performed by: ORTHOPAEDIC SURGERY

## 2025-02-11 PROCEDURE — 3008F BODY MASS INDEX DOCD: CPT | Mod: CPTII,,, | Performed by: ORTHOPAEDIC SURGERY

## 2025-02-11 PROCEDURE — 3074F SYST BP LT 130 MM HG: CPT | Mod: CPTII,,, | Performed by: ORTHOPAEDIC SURGERY

## 2025-02-11 PROCEDURE — 3079F DIAST BP 80-89 MM HG: CPT | Mod: CPTII,,, | Performed by: ORTHOPAEDIC SURGERY

## 2025-02-11 PROCEDURE — 73080 X-RAY EXAM OF ELBOW: CPT | Mod: LT,,, | Performed by: ORTHOPAEDIC SURGERY

## 2025-02-11 PROCEDURE — 99213 OFFICE O/P EST LOW 20 MIN: CPT | Mod: ,,, | Performed by: ORTHOPAEDIC SURGERY

## 2025-02-11 RX ORDER — PRASUGREL 10 MG/1
TABLET, FILM COATED ORAL
COMMUNITY

## 2025-02-11 RX ORDER — NYSTATIN 500000 [USP'U]/1
TABLET, COATED ORAL
COMMUNITY
Start: 2025-02-06

## 2025-02-11 RX ORDER — ASPIRIN 81 MG/1
1 TABLET ORAL EVERY MORNING
COMMUNITY
Start: 2024-12-30

## 2025-02-11 RX ORDER — ACETAMINOPHEN 500 MG
1 TABLET ORAL DAILY
COMMUNITY
Start: 2025-02-06

## 2025-02-11 RX ORDER — METOPROLOL TARTRATE AND HYDROCHLOROTHIAZIDE 50; 25 MG/1; MG/1
TABLET ORAL
COMMUNITY

## 2025-02-11 RX ORDER — NITROGLYCERIN 0.4 MG/1
TABLET SUBLINGUAL
COMMUNITY

## 2025-02-11 RX ORDER — ROSUVASTATIN CALCIUM 10 MG/1
1 TABLET, COATED ORAL DAILY
COMMUNITY
Start: 2024-12-29

## 2025-02-13 NOTE — PROGRESS NOTES
Subjective:       Patient ID: Jennifer Corley is a 69 y.o. female.  Chief Complaint   Patient presents with    Left Elbow - Follow-up     3 month f/u from left elbow dislocation. Reports improvement in ROM. Not currently working with physical therapy.       HPI:  History of Present Illness    HPI:  Ms. Corley presents for follow-up regarding her elbow. She reports mild, localized pain. She acknowledges never completing recommended physical therapy.    She inquires about exercises, particularly those involving wrist bending, as she feels discomfort in the muscles connecting to the affected area.    Ms. Corley discloses a recent cardiac event. She initially experienced chest pain but hesitated to seek emergency care. Paramedics were dispatched and noted high blood pressure. Following their advice, she went to the hospital later that night, where it was confirmed she had suffered a heart attack.    Ms. Corley denies any medical history of elbow fractures or heterotrophic ossification.    PREVIOUS TREATMENTS:  Ms. Corley did not complete physical therapy for her elbow injury.    IMAGING:  In 2025, the patient underwent X-rays of the left elbow, which included 3 views. The results showed age-appropriate bone structure with good joint line. There were no signs of fractures or heterotrophic ossification. The joints appeared ball-lined with osteoarthritis.    SOCIAL HISTORY:  Regarding alcohol use, the patient clarified that her beverage was water, not beer.      ROS:  Cardiovascular: +chest pain  Musculoskeletal: +joint pain          ROS:  Constitutional: Denies fever chills  Eyes: No change in vision  ENT: No ringing or current infections  CV: No chest pain  Resp: No labored breathing  MSK: Pain evident at site of injury located in HPI,   Integ: No signs of abrasions or lacerations  Neuro: No numbness or tingling  Lymphatic: No swelling outside the area of injury     Current Outpatient Medications on File Prior to Visit   Medication  "Sig Dispense Refill    aspirin (ECOTRIN) 81 MG EC tablet Take 1 tablet by mouth every morning.      cholecalciferol, vitamin D3, 125 mcg (5,000 unit) Tab Take 1 tablet by mouth once daily.      hydrOXYzine HCL (ATARAX) 25 MG tablet       metoprolol ta-hydrochlorothiaz (LOPRESSOR HCT) 50-25 mg per tablet 25mg 1/day      nitroGLYCERIN (NITROSTAT) 0.4 MG SL tablet PLACE 1 TABLET UNDER TONGUE AS DIRECTED FOR CHEST FOR PAIN. MAY REPEAT EVERY 5 MINS. MAX OF 3 TABLETS. IF NO RELIEF GO TO ER      nystatin (MYCOSTATIN) 500,000 unit Tab Take 1 tablet 3 times a day by oral route.      OLANZapine (ZYPREXA) 15 MG Tab Take 15 mg by mouth every evening.      prasugreL HCl (EFFIENT) 10 mg Tab 10mg 1/day      rosuvastatin (CRESTOR) 10 MG tablet Take 1 tablet by mouth once daily.      sertraline (ZOLOFT) 100 MG tablet Take 100 mg by mouth every morning.      HYDROcodone-acetaminophen (NORCO) 7.5-325 mg per tablet Take 1 tablet by mouth every 4 (four) hours as needed for Pain. (Patient not taking: Reported on 2/11/2025) 34 tablet 0    ondansetron (ZOFRAN-ODT) 4 MG TbDL Take 1 tablet (4 mg total) by mouth every 6 (six) hours as needed (Nausea). (Patient not taking: Reported on 2/11/2025) 15 tablet 0     No current facility-administered medications on file prior to visit.          Objective:      /80   Ht 5' 6" (1.676 m)   Wt 72 kg (158 lb 11.7 oz)   BMI 25.62 kg/m²   General the patient is alert and oriented x3 no acute distress nontoxic-appearing appropriate affect.    Constitutional: Vital signs are examined and stable.  Resp: No signs of labored breathing    LUE: --Skin:  Full range of motion supination pronation flexion-extension mild point tenderness over the mobile wad and lateral epicondyle         -MSK: STR 5/5 AIN/PIN/Median/Radial/Ulnar motor           -Neuro:  Sensation intact to light touch C5-T1 dermatomes           -Lymphatic: No signs of lymphadenopathy, No signs of swelling,           -CV:Capillary refill is " "less than 2 seconds. Radial and ulnar pulses 2/4. Compartments Soft and compressible              Body mass index is 25.62 kg/m².  Ideal body weight: 59.3 kg (130 lb 11.7 oz)  Adjusted ideal body weight: 64.4 kg (141 lb 14.9 oz)  No results found for: "HGBA1C"  Hgb   Date Value Ref Range Status   11/03/2024 13.7 12.0 - 16.0 g/dL Final   08/19/2020 13.1 12.0 - 16.0 gm/dL Final     No results found for: "[id number]"  WBC   Date Value Ref Range Status   11/03/2024 10.37 4.50 - 11.50 x10(3)/mcL Final   08/19/2020 5.2 4.5 - 11.5 x10(3)/mcL Final       Radiology:  Three views left elbow skeletally mature individual shows an intact elbow joint without signs of dislocation        Assessment:         1. Closed posterior dislocation of left elbow, subsequent encounter  X-Ray Elbow Complete Left              Plan:         No follow-ups on file.    Jennifer was seen today for follow-up.    Diagnoses and all orders for this visit:    Closed posterior dislocation of left elbow, subsequent encounter  -     X-Ray Elbow Complete Left; Future      Assessment & Plan    PLAN SUMMARY:  - Consider using bands around the elbow area for pain management  - Perform wrist extension exercises for elbow pain relief  - X-ray results of the left elbow reviewed    PROCEDURES:  - Reviewed x-ray results of the left elbow.    PATIENT INSTRUCTIONS:  - Perform wrist extension exercises to help with elbow pain.  - Consider using bands around the elbow area if pain increases.          Mild pain over Ext mass of the elbow. Min concerns at this time. Has great ROM. Follow up PRN. She does not wish to have PT for her new complaints.      This note/OR report was created with the assistance of  voice recognition software or phone  dictation.  There may be transcription errors as a result of using this technology however minimal. Effort has been made to assure accuracy of transcription but any obvious errors or omissions should be clarified with the author of " the document.     This note was generated with the assistance of ambient listening technology. Verbal consent was obtained by the patient and accompanying visitor(s) for the recording of patient appointment to facilitate this note. I attest to having reviewed and edited the generated note for accuracy, though some syntax or spelling errors may persist. Please contact the author of this note for any clarification.       Pola Riley DO  Orthopedic Trauma Surgery  02/13/2025      Future Appointments   Date Time Provider Department Center   7/22/2025  1:00 PM Jesse Vilchis MD [facility] [id number]